# Patient Record
Sex: FEMALE | Race: WHITE | URBAN - METROPOLITAN AREA
[De-identification: names, ages, dates, MRNs, and addresses within clinical notes are randomized per-mention and may not be internally consistent; named-entity substitution may affect disease eponyms.]

---

## 2024-02-13 ENCOUNTER — OFFICE VISIT (OUTPATIENT)
Age: 49
End: 2024-02-13
Payer: COMMERCIAL

## 2024-02-13 VITALS — HEIGHT: 64 IN | WEIGHT: 132 LBS | BODY MASS INDEX: 22.53 KG/M2

## 2024-02-13 DIAGNOSIS — N64.4 MASTODYNIA OF LEFT BREAST: ICD-10-CM

## 2024-02-13 DIAGNOSIS — N64.4 MASTODYNIA OF RIGHT BREAST: Primary | ICD-10-CM

## 2024-02-13 PROCEDURE — 99203 OFFICE O/P NEW LOW 30 MIN: CPT | Performed by: SURGERY

## 2024-02-13 PROCEDURE — 76642 ULTRASOUND BREAST LIMITED: CPT | Performed by: SURGERY

## 2024-02-13 PROCEDURE — G8420 CALC BMI NORM PARAMETERS: HCPCS | Performed by: SURGERY

## 2024-02-13 PROCEDURE — G8428 CUR MEDS NOT DOCUMENT: HCPCS | Performed by: SURGERY

## 2024-02-13 PROCEDURE — 4004F PT TOBACCO SCREEN RCVD TLK: CPT | Performed by: SURGERY

## 2024-02-13 PROCEDURE — G8484 FLU IMMUNIZE NO ADMIN: HCPCS | Performed by: SURGERY

## 2024-02-13 RX ORDER — FLUOXETINE HYDROCHLORIDE 20 MG/1
20 CAPSULE ORAL EVERY EVENING
COMMUNITY
Start: 2024-01-03

## 2024-02-13 NOTE — PROGRESS NOTES
HISTORY OF PRESENT ILLNESS  Angela Ladd is a 48 y.o. female     HPI NEW patient consult referred for Bilateral breast pain x 1 month.  Started on the LEFT, now on both sides, some days worse than others. It fluctuates throughout the day, hurts to wear a bra.   Menopause age 46    2007 Bilateral breast implants     No family hx of breast or ovarian cancer     Mammogram  [facility] 05/27/2022, BI-RADS 1  Density B    No past medical history on file.  No past surgical history on file.   OB History    No obstetric history on file.      Obstetric Comments   Menarche 14, LMP 2021, # of children 3, age of 1st delivery 22, Hysterectomy/oophorectomy No/No, Breast bx No, history of breast feeding Yes, BCP Yes, Hormone therapy No             No family history on file.  Social History     Tobacco Use    Smoking status: Not on file    Smokeless tobacco: Not on file   Substance Use Topics    Alcohol use: Not on file      Prior to Admission medications    Medication Sig Start Date End Date Taking? Authorizing Provider   FLUoxetine (PROZAC) 20 MG capsule Take 1 capsule by mouth every evening 1/3/24  Yes Provider, Historical, MD      No Known Allergies               Review of Systems      Physical Exam  Chest:   Breasts:     Right: No swelling, mass, skin change or tenderness.      Left: No swelling, mass, skin change or tenderness.          Comments: Bilateral implants  Lymphadenopathy:      Upper Body:      Right upper body: No axillary adenopathy.      Left upper body: No axillary adenopathy.        BREAST ULTRASOUND  Indication: bilateral breast pain   Technique:  The area was scanned using a high-frequency linear-array near-field transducer  Findings: No abnormal mass, lesion, or shadowing noted.    Impression: Normal dense breast tissue   Disposition: No worrisome finding on ultrasound     ASSESSMENT and PLAN   Diagnosis Orders   1. Mastodynia of right breast        2. Mastodynia of left breast        I spent 30 minutes reviewing